# Patient Record
Sex: MALE | Race: BLACK OR AFRICAN AMERICAN | Employment: FULL TIME | URBAN - METROPOLITAN AREA
[De-identification: names, ages, dates, MRNs, and addresses within clinical notes are randomized per-mention and may not be internally consistent; named-entity substitution may affect disease eponyms.]

---

## 2017-01-10 ENCOUNTER — OFFICE VISIT (OUTPATIENT)
Dept: ORTHOPEDIC SURGERY | Age: 32
End: 2017-01-10

## 2017-01-10 VITALS
DIASTOLIC BLOOD PRESSURE: 112 MMHG | TEMPERATURE: 97.5 F | HEART RATE: 79 BPM | HEIGHT: 73 IN | SYSTOLIC BLOOD PRESSURE: 177 MMHG

## 2017-01-10 DIAGNOSIS — S86.011A ACHILLES TENDON TEAR, RIGHT, INITIAL ENCOUNTER: Primary | ICD-10-CM

## 2017-01-10 DIAGNOSIS — M25.571 RIGHT ANKLE PAIN, UNSPECIFIED CHRONICITY: ICD-10-CM

## 2017-01-10 DIAGNOSIS — M79.671 RIGHT FOOT PAIN: ICD-10-CM

## 2017-01-10 RX ORDER — PROPRANOLOL HYDROCHLORIDE 60 MG/1
60 TABLET ORAL 3 TIMES DAILY
COMMUNITY

## 2017-01-10 RX ORDER — AMLODIPINE BESYLATE 10 MG/1
TABLET ORAL DAILY
COMMUNITY

## 2017-01-10 RX ORDER — RISPERIDONE 2 MG/1
TABLET, FILM COATED ORAL
COMMUNITY

## 2017-01-10 RX ORDER — CALCIUM CARBONATE/VITAMIN D3 500 MG-10
TABLET ORAL
COMMUNITY

## 2017-01-10 RX ORDER — DOXEPIN HYDROCHLORIDE 100 MG/1
CAPSULE ORAL
COMMUNITY

## 2017-01-10 NOTE — MR AVS SNAPSHOT
Visit Information Date & Time Provider Department Dept. Phone Encounter #  
 1/10/2017  9:20 AM Reyna Turner MD [facility] [phone] [id number] Upcoming Health Maintenance Date Due DTaP/Tdap/Td series (1 - Tdap) 9/6/2008 INFLUENZA AGE 9 TO ADULT 8/1/2016 Allergies as of 1/10/2017  Review Complete On: 1/10/2017 By: Willie George Severity Noted Reaction Type Reactions Peanut  01/10/2017    Rash Soy  01/10/2017    Rash Current Immunizations  Never Reviewed No immunizations on file. Not reviewed this visit You Were Diagnosed With   
  
 Codes Comments Achilles tendon tear, right, initial encounter    -  Primary ICD-10-CM: R50.006J ICD-9-CM: 845.09 Right foot pain     ICD-10-CM: M79.671 ICD-9-CM: 729.5 Right ankle pain, unspecified chronicity     ICD-10-CM: M25.571 ICD-9-CM: 719.47 Vitals BP Pulse Temp Height(growth percentile) Smoking Status (!) 177/112 79 97.5 °F (36.4 °C) (Oral) 6' 1\" (1.854 m) Former Smoker Your Updated Medication List  
  
   
This list is accurate as of: 1/10/17  9:39 AM.  Always use your most recent med list. amLODIPine 10 mg tablet Commonly known as:  Lorene Croon Take  by mouth daily. Calcium-Cholecalciferol (D3) 500 mg(1,250mg) -400 unit Tab Take  by mouth. doxepin 100 mg capsule Commonly known as:  SINEquan Take  by mouth nightly. propranolol 60 mg tablet Commonly known as:  INDERAL Take 60 mg by mouth three (3) times daily. risperiDONE 2 mg tablet Commonly known as:  RisperDAL Take  by mouth. To-Do List   
 01/10/2017 Imaging:  MRI ANKLE RT WO CONT Patient Instructions Please follow up after MRI. You are advised to contact us if your condition worsens. An MRI has been ordered for you.  A [employer] will be contacting you to schedule the appointment as soon as it has been approved with your insurance company. Please schedule an appointment to follow up with the doctor or the physicians assistant after the MRI has been conducted. Achilles Tendon Tear: Care Instructions Your Care Instructions You have ruptured or torn your Achilles tendon. The Achilles tendon (also called the heel cord) connects the calf muscles on the back of the lower leg to the bone at the base of the heel. Treatment for an Achilles tendon injury depends on whether the tendon has been partially torn or completely ruptured. A cast or splint can often treat a partial tear. If your tendon has ruptured, you may need surgery. You and your orthopedic doctor will choose a treatment plan, so it is important to go to any follow-up appointments. Follow-up care is a key part of your treatment and safety. Be sure to make and go to all appointments, and call your doctor if you are having problems. Its also a good idea to know your test results and keep a list of the medicines you take. How can you care for yourself at home? · Prop up the sore foot on a pillow anytime you sit or lie down during the next 3 days. Try to keep it above the level of your heart. This will help reduce swelling. · Take pain medicines exactly as directed. ¨ If the doctor gave you a prescription medicine for pain, take it as prescribed. ¨ If you are not taking a prescription pain medicine, ask your doctor if you can take an over-the-counter medicine. · Do not put weight on the affected foot until your doctor says you can. Use crutches or a walker. · Wear the splint or cast as directed until your doctor says you can remove it. When should you call for help? Call 911 anytime you think you may need emergency care. For example, call if: 
· You have sudden chest pain and shortness of breath, or you cough up blood. Call your doctor now or seek immediate medical care if: · You have increased or severe pain. · Your foot is cool or pale or changes color. · You have tingling, weakness, or numbness in your toes. · Your cast or splint feels too tight. · You cannot move your toes. · You have a fever, or there is drainage or a bad smell coming from the cast. 
· You have signs of a blood clot, such as: 
¨ Pain in your calf, back of the knee, thigh, or groin. ¨ Redness or swelling in your leg. · The skin under your cast or splint burns or stings. Watch closely for changes in your health, and be sure to contact your doctor if: 
· You do not get better as expected. Where can you learn more? Go to http://chad-val.info/. Enter F495 in the search box to learn more about \"Achilles Tendon Tear: Care Instructions. \" Current as of: May 23, 2016 Content Version: 11.1 © 2496-7668 [employer]. Care instructions adapted under license by [employer] (which disclaims liability or warranty for this information). If you have questions about a medical condition or this instruction, always ask your healthcare professional. [place] [address] any warranty or liability for your use of this information. Introducing [facility]! Ingrid Brown introduces [employer] patient portal. Now you can access parts of your medical record, email your doctor's office, and request medication refills online. 1. In your internet browser, go to [email or web address]. [employer] 2. Click on the First Time User? Click Here link in the Sign In box. You will see the New Member Sign Up page. 3. Enter your [facility] [employer] Access Code exactly as it appears below. You will not need to use this code after youve completed the sign-up process. If you do not sign up before the expiration date, you must request a new code. · [facility] [employer] [facility] Access Code: Roman Vizcarra Expires: 4/10/2017  9:39 AM 
 
 4. Enter the last four digits of your Social Security Number (xxxx) and Date of Birth (mm/dd/yyyy) as indicated and click Submit. You will be taken to the next sign-up page. 5. Create a [employer] ID. This will be your [employer] login ID and cannot be changed, so think of one that is secure and easy to remember. 6. Create a [employer] password. You can change your password at any time. 7. Enter your Password Reset Question and Answer. This can be used at a later time if you forget your password. 8. Enter your e-mail address. You will receive e-mail notification when new information is available in [address] 9. Click Sign Up. You can now view and download portions of your medical record. 10. Click the Download Summary menu link to download a portable copy of your medical information. If you have questions, please visit the Frequently Asked Questions section of the [employer] website. Remember, [employer] is NOT to be used for urgent needs. For medical emergencies, dial 911. Now available from your iPhone and Android! Please provide this summary of care documentation to your next provider. If you have any questions after today's visit, please call [phone].

## 2017-01-10 NOTE — PROGRESS NOTES
AMBULATORY PROGRESS NOTE      Patient: Maria Del Rosario Muñiz             MRN: [id number]     SSN: [id number] There is no height or weight on file to calculate BMI. YOB: 1985     AGE: 36 y.o. EX: male    PCP: No primary care provider on file. IMPRESSION/DIAGNOSIS AND TREATMENT PLAN     DIAGNOSES  1. Achilles tendon tear, right, initial encounter    2. Right foot pain    3. Right ankle pain, unspecified chronicity        Orders Placed This Encounter    MRI ANKLE RT WO CONT      Maria Del Rosario Muñiz understands his diagnoses and the proposed plan. My impression is a 24-year-old male who about five months ago sustained an injury to the right Achilles tendon. I suspect a partial tear or rupture to the Achilles tendon distally, but he has a negative Powell sign today on the right and on the left. He is definitely thickened distally, so I am pretty sure he injured the Achilles tendon distally. Recommendation is for an MRI of his right Achilles tendon to assess the healing of this. I may recommend a hinged-type, AFO-type brace in this individual who definitely has shown signs of healing to this but has thickening to the area. So, I suspect an interstitial tear or a rupture that has gone on to heal.  He has been unprotected, so I think he is going to require a hinged-type AFO brace. I did talk to him about surgical intervention, particularly in those patients that are profoundly weak, but it would be a reconstruction, not any type of repair, but certainly an MRI is indicated in this individual.     Plan:    1) MRI: Right ankle; Mid tibia down to include ankle and Achilles' tendon: Assess Achilles' tendon, r/o tear     RTO - after MRI    HPI AND EXAMINATION     Maria Del Rosario Muñiz IS A 36 y.o. male who presents to my outpatient office complaining of: right Achilles' tendon pain. Patient reports that in August 2016 he was playing basketball in the gym when he heard a pop.  He states he has been waking up every morning with pain. He reports that he has to adjust his gait to walk because of the pain. Paradise Bolaños is alert/oriented (name, location, time) and follows commands well. he  is in no acute distress and his affect and mood are appropriate. He arrives here in [facility], as he is a prisoner. He is in the presence of two alf guards. Right ACHILLES GASTROCNEMIUS COMPLEX,PLANTAR FASCIA    Gait: slow  Tenderness: No Achilles tendon sheath Insertional point    YES Noninsertional Achilles tendon moderate tenderness; Mild tenderness to the plantar fascia   Calf tenderness: Absent for calf or gastrocnemius muscle regions   Soft, supple, non tender, non taut lower extremity compartments   NONE to Medial Malleolar, 4/5 Met base midfoot, achilles, tib post, or   NONE to syndesmosis. minimal to plantar fascia, or central calcaneal region  Deformity/Swelling:  NO  Insertional point of Distal Achilles Tendon:    NO Fusiform noninsertional focal tendinopathy   NO Shelly's Deformity Present   Thickening to the distal one-third of the Achilles is present in the noninsertional portion of this. Cutaneous: No rashes, skin patches, wounds, or abrasions to the lower legs           Warm and Normal color. No regions of expressible drainage. Medial Border of Tibia Region: absent           Skin color, texture, turgor normal. Normal.  Joint Motion: ROM Ankle: Decreased , Hindfoot: (ST,TN,CC Normal}, Forefoot toes:Normal  Negative Powell sign  Neurologic Exam: Neuro: Motor: no muscle wasting or atrophy and Sensory : no sensory deficits noted. Stance Heel Rise not tested. No abnormal hand/wrist, foot/ankle, or facial/neck tremors. Contractures: Gastrocnemius or Achilles Contractures absent.    Joint / Tendon Stability:    No anterolateral or varus instability of the Ankle or Subtalar Joints              No peroneal tendon instability present with ankle circumduction  Alignment:  Normal Foot Alignment mild and  Flexible, Semi Rigid and Rigid  Vascular: Normal Pulses/ NL Capillary refill, No evidence of DVT seen on physical exam.   No calf swelling, no tenderness to calf. Varicosities Lower Limbs :none  Lymphatic:  No Evidence of Lymphedema    CHART REVIEW     Past Medical History   Diagnosis Date    Hypertension      Current Outpatient Prescriptions   Medication Sig    risperiDONE (RISPERDAL) 2 mg tablet Take  by mouth.  doxepin (SINEQUAN) 100 mg capsule Take  by mouth nightly.  amLODIPine (NORVASC) 10 mg tablet Take  by mouth daily.  propranolol (INDERAL) 60 mg tablet Take 60 mg by mouth three (3) times daily.  Calcium-Cholecalciferol, D3, 500 mg(1,250mg) -400 unit tab Take  by mouth. No current facility-administered medications for this visit. Allergies   Allergen Reactions    Peanut Rash    Soy Rash     History reviewed. No pertinent past surgical history. Social History     Occupational History    Not on file. Social History Main Topics    Smoking status: Former Smoker    Smokeless tobacco: Not on file    Alcohol use No    Drug use: No    Sexual activity: Not on file     History reviewed. No pertinent family history. REVIEW OF SYSTEMS : 1/10/2017  ALL BELOW ARE Negative except : SEE HPI       Constitutional: Negative for fever, chills and weight loss. Neg Weigh Loss  Cardiovascular: Negative for chest pain, claudication and leg swelling. SOB, SIFUENTES   Gastrointestinal: Negative for  pain, N/V/D/C, Blood in stool or urine,dysuria, hematuria,        Incontinence, pelvic pain  Musculoskeletal: see HPI. Neurological: Negative for dizziness and weakness. Negative for headaches,Visual Changes, Confusion, Seizures,   Psychiatric/Behavioral: Negative for depression, memory loss and substance abuse. Extremities:  Negative for  hair changes, rash or skin lesion changes.   Hematologic: Negative for Bleeding problems, bruising, pallor or swollen lymph nodes.   Peripheral Vascular: No calf pain, vascular vein tenderness to calf pain              No calf throbbing, posterior knee throbbing pain    DIAGNOSTIC IMAGING     No notes on file    Written by Tiffanie Gill, as dictated by Allegra Melgar MD.

## 2017-01-10 NOTE — PATIENT INSTRUCTIONS
Please follow up after MRI. You are advised to contact us if your condition worsens. An MRI has been ordered for you. [facility] will be contacting you to schedule the appointment as soon as it has been approved with your insurance company. Please schedule an appointment to follow up with the doctor or the physicians assistant after the MRI has been conducted. Achilles Tendon Tear: Care Instructions  Your Care Instructions    You have ruptured or torn your Achilles tendon. The Achilles tendon (also called the heel cord) connects the calf muscles on the back of the lower leg to the bone at the base of the heel. Treatment for an Achilles tendon injury depends on whether the tendon has been partially torn or completely ruptured. A cast or splint can often treat a partial tear. If your tendon has ruptured, you may need surgery. You and your orthopedic doctor will choose a treatment plan, so it is important to go to any follow-up appointments. Follow-up care is a key part of your treatment and safety. Be sure to make and go to all appointments, and call your doctor if you are having problems. Its also a good idea to know your test results and keep a list of the medicines you take. How can you care for yourself at home? · Prop up the sore foot on a pillow anytime you sit or lie down during the next 3 days. Try to keep it above the level of your heart. This will help reduce swelling. · Take pain medicines exactly as directed. ¨ If the doctor gave you a prescription medicine for pain, take it as prescribed. ¨ If you are not taking a prescription pain medicine, ask your doctor if you can take an over-the-counter medicine. · Do not put weight on the affected foot until your doctor says you can. Use crutches or a walker. · Wear the splint or cast as directed until your doctor says you can remove it. When should you call for help? Call 911 anytime you think you may need emergency care.  For example, call if:  · You have sudden chest pain and shortness of breath, or you cough up blood. Call your doctor now or seek immediate medical care if:  · You have increased or severe pain. · Your foot is cool or pale or changes color. · You have tingling, weakness, or numbness in your toes. · Your cast or splint feels too tight. · You cannot move your toes. · You have a fever, or there is drainage or a bad smell coming from the cast.  · You have signs of a blood clot, such as:  ¨ Pain in your calf, back of the knee, thigh, or groin. ¨ Redness or swelling in your leg. · The skin under your cast or splint burns or stings. Watch closely for changes in your health, and be sure to contact your doctor if:  · You do not get better as expected. Where can you learn more? Go to http://chad-val.info/. Enter F495 in the search box to learn more about \"Achilles Tendon Tear: Care Instructions. \"  Current as of: May 23, 2016  Content Version: 11.1  © 6770-3756 [employer]. Care instructions adapted under license by [employer] (which disclaims liability or warranty for this information). If you have questions about a medical condition or this instruction, always ask your healthcare professional. [address] any warranty or liability for your use of this information.